# Patient Record
(demographics unavailable — no encounter records)

---

## 2024-12-02 NOTE — HISTORY OF PRESENT ILLNESS
[FreeTextEntry1] : follow up  [de-identified] : 55 year old male with pmhx of anxiety, obesity, prediabetes, neuropathy, psoriasis, factor V leiden, dyslipidemia presents for follow up regarding weight loss. Patient is now 185 lbs, his goal weight. He states is on Monjaro 5 mg and follows a very strict diet with a strict caloric deficit. He reports being nervous to stop the Monjaro. He also followed with his Neurologist after a back injury while playing pickle ball and was told to take a 10 day course of Naproxen with his Pregablin. He continues to take Xarelto for Factor V Leiden with no concerns as well as Skyrizi for Psoriaisis with no concerns. He denies any complaints today. Declines flu shot.

## 2024-12-02 NOTE — ASSESSMENT
[FreeTextEntry1] : 55 year old male with pmhx of anxiety, obesity, psoriasis, favor V leiden, hyperlipidemia, prediabetees, neuropathy presents for follow up.   #Obesity:  - Now resolved, 185 lbs at goal weight  - Continue lifestyle modifications counseled on eating healthy nutritious foods and avoiding severe caloric deficits - Counseled on exercise - Continue Monjoraro 2.5 for now with lifestyle modifications  Prediabetes - Last A1c 6.1, f/u repeat ordered  HLD - F/u repeat lipid panel  #Neuropathy - Continue Pregablin and Naproxen   #Psoriasis  - Continue Sykrizi  #Anxiety  - Consider stopping as patient does not feel it necessary to take  GERD - consider weaning omeprazole - most of symptoms related to weight

## 2025-04-22 NOTE — PHYSICAL EXAM
[FreeTextEntry3] : Skin examination performed of the face, neck, trunk, arms, legs;  The patient is well, alert and oriented, pleasant and cooperative. Eyelids, conjunctivae, oral mucosa, digits and nails all normal.   No cervical adenopathy.  Normal findings include:  Scaling waxy stuck on papule; Left temple Multiple benign nevi were noted.  Angiomas + lentigines and solar damage are present in sun exposed areas;   No lesions were suspicious for malignancy.   all areas on trunk, UEs, LEs, inverse areas on abdomen;  resolved; with PIH;

## 2025-04-22 NOTE — ASSESSMENT
[FreeTextEntry1] : Complete skin examination is negative for malignancy; Multiple new concerns were addressed and discussed. Therapeutic options and their risks and benefits; along with multiple diagnostic possibilities were discussed at length; risks and benefits of skin biopsy and/or other further study were discussed;  Continue regular exams; Follow up for TBSE in 1 year Follow up for TBSE in 1 year  r/b skyrizi, biologics, maintenance discussed;  Therapeutic options and their risks and benefits; along with multiple diagnostic possibilities were discussed at length; risks and benefits of further study were discussed;  Pt. may attempt to wean off skyrizi;  discussed that psoriasis was adult onset, weight loss will minimize risk recurrence restart if psoriasis begins to recur

## 2025-04-22 NOTE — HISTORY OF PRESENT ILLNESS
[de-identified] : Pt. presents for skin check; c/o few spots of concern;  face, back Severity:  mild   Modifying factors:  none Associated symptoms:  none Context:  no association with activity   Pt. on Skyrizi since 3/2022;  tolerating well skin clearing nicely;  feels well- recently had 80 lb weight loss on Mounjaro; inquiring re: trial off Skyrizi

## 2025-06-26 NOTE — REASON FOR VISIT
[Follow-Up - Clinic] : a clinic follow-up of [Chest Pain] : chest pain [Dyspnea] : dyspnea [Spouse] : spouse

## 2025-06-26 NOTE — DISCUSSION/SUMMARY
[FreeTextEntry1] : This is a 55 year old man with a history of hyperlipidemia, and  GERD with Barretts who presented to the office for follow up.  Since his last visit he has taken control of his health, and has lost 85 pounds.  He was on Mounajro, ate better, and exercised.  He recently lost his best friend to an MI.  He had a panic attack a few weeks ago and went to [facility] with dyspnea.  He ruled out for ACS, and had a negative CT PA.  He wants to have his coronaries reassessed.   I am sending him for a CT coronaries.   He has a history of a right below the knee DVT.  He has been on Xarelto. We discussed the benefits of a healthy diet, regular exercise and physical activity, and weight loss in detail.  I will call him with the results, and to schedule any necessary follow up.  [EKG obtained to assist in diagnosis and management of assessed problem(s)] : EKG obtained to assist in diagnosis and management of assessed problem(s)

## 2025-06-26 NOTE — HISTORY OF PRESENT ILLNESS
[FreeTextEntry1] : This is a 55 year old man with a history of hyperlipidemia, and  GERD with Barretts who presented to the office for follow up.  Since his last visit he has taken control of his health, and has lost 85 pounds.  He was on Mounajro, ate better, and exercised.  He recently lost his best friend to an MI.  He had a panic attack a few weeks ago and went to [facility] with dyspnea.  He ruled out for ACS, and had a negative CT PA.  He wants to have his coronaries reassessed.    He has a history of a right below the knee DVT.  He has been on Xarelto.  He is heterozygous for Factor V.   He had a CT of the abdomen and pelvis that showed atherosclerotic changes of the pelvic vasculature.    He had a negative CT coronaries 10 years ago.  His calcium score was 0.

## 2025-06-26 NOTE — PHYSICAL EXAM
[Normal Appearance] : normal appearance [General Appearance - In No Acute Distress] : no acute distress [Normal Conjunctiva] : the conjunctiva exhibited no abnormalities [Normal Oral Mucosa] : normal oral mucosa [Normal Jugular Venous V Waves Present] : normal jugular venous V waves present [FreeTextEntry1] : No JVD [Respiration, Rhythm And Depth] : normal respiratory rhythm and effort [Exaggerated Use Of Accessory Muscles For Inspiration] : no accessory muscle use [Auscultation Breath Sounds / Voice Sounds] : lungs were clear to auscultation bilaterally [Bowel Sounds] : normal bowel sounds [Abdomen Soft] : soft [Abdomen Tenderness] : non-tender [Abnormal Walk] : normal gait [Gait - Sufficient For Exercise Testing] : the gait was sufficient for exercise testing [Nail Clubbing] : no clubbing of the fingernails [Cyanosis, Localized] : no localized cyanosis [Skin Color & Pigmentation] : normal skin color and pigmentation [] : no rash [Oriented To Time, Place, And Person] : oriented to person, place, and time [No Anxiety] : not feeling anxious [Not Palpable] : not palpable [No Precordial Heave] : no precordial heave was noted [Normal Rate] : normal [Rhythm Regular] : regular [Normal S1] : normal S1 [Normal S2] : normal S2 [No Murmur] : no murmurs heard [Right Carotid Bruit] : no bruit heard over the right carotid [Left Carotid Bruit] : no bruit heard over the left carotid [2+] : left 2+ [No Abnormalities] : the abdominal aorta was not enlarged and no bruit was heard [No Pitting Edema] : no pitting edema present

## 2025-07-26 NOTE — ASSESSMENT
[FreeTextEntry1] : Patient is a 55 year old male with pmhx of anxiety, obesity, prediabetes, neuropathy, psoriasis, factor V leiden, dyslipidemia presents for follow up regarding multiple concerns.  #Diabetes/ weight management -Continue mounjaro (will fill out prior auth) -Sent A1c and lipid panel script, f/u results  Dyslipidemia - recent calcium score - 0  - follows cardio - doing well  #Anxiety -BRETT-7 of 14, moderate anxiety 2/2 to work and general life events, presents mostly as dizziness -Previously on fluoxetine 10mg, worked well, will resume today -Deferred therapy/psychiatrist for now  -Obtained cardiac calcium CT scan to r/o cardiac origin of dizziness, wnl (stage 1 disease)   Neuropathy - much improved since maintaining his weight loss  
No abnormalities noted

## 2025-07-26 NOTE — PHYSICAL EXAM
[No Acute Distress] : no acute distress [Well Nourished] : well nourished [Well Developed] : well developed [PERRL] : pupils equal round and reactive to light [EOMI] : extraocular movements intact [No Lymphadenopathy] : no lymphadenopathy [Supple] : supple [No Respiratory Distress] : no respiratory distress  [No Accessory Muscle Use] : no accessory muscle use [Clear to Auscultation] : lungs were clear to auscultation bilaterally [Normal Rate] : normal rate  [Regular Rhythm] : with a regular rhythm [Normal S1, S2] : normal S1 and S2 [Soft] : abdomen soft [Non Tender] : non-tender [Non-distended] : non-distended [Grossly Normal Strength/Tone] : grossly normal strength/tone [No Focal Deficits] : no focal deficits

## 2025-07-26 NOTE — HISTORY OF PRESENT ILLNESS
[FreeTextEntry8] : Patient is a 55 year old male with pmhx of anxiety, obesity, prediabetes, neuropathy, psoriasis, factor V leiden, dyslipidemia presents for follow up regarding multiple concerns. Patient requires a prior authorization for mounjaro. He wants to also check his A1c and lipid panel today. He also endorses since his last anxiety attack (that required a hospital visit), he has been feeling more anxious every day (BRETT-7 score of 14, moderate anxiety). He was on fluoxetine in the past and admits that helped.  Patient had a cardiac calcium CT scan recently (can be found scanned in labs) with Dr. Medina